# Patient Record
Sex: FEMALE | Race: WHITE | Employment: UNEMPLOYED | URBAN - METROPOLITAN AREA
[De-identification: names, ages, dates, MRNs, and addresses within clinical notes are randomized per-mention and may not be internally consistent; named-entity substitution may affect disease eponyms.]

---

## 2020-11-16 ENCOUNTER — OFFICE VISIT (OUTPATIENT)
Dept: ORTHOPEDICS | Facility: CLINIC | Age: 40
End: 2020-11-16
Payer: OTHER GOVERNMENT

## 2020-11-16 ENCOUNTER — ANCILLARY PROCEDURE (OUTPATIENT)
Dept: GENERAL RADIOLOGY | Facility: CLINIC | Age: 40
End: 2020-11-16
Attending: ORTHOPAEDIC SURGERY
Payer: OTHER GOVERNMENT

## 2020-11-16 VITALS
BODY MASS INDEX: 23.39 KG/M2 | HEIGHT: 63 IN | WEIGHT: 132 LBS | SYSTOLIC BLOOD PRESSURE: 117 MMHG | DIASTOLIC BLOOD PRESSURE: 78 MMHG

## 2020-11-16 DIAGNOSIS — M25.512 ACUTE PAIN OF LEFT SHOULDER: Primary | ICD-10-CM

## 2020-11-16 DIAGNOSIS — M25.512 CHRONIC PERISCAPULAR PAIN ON LEFT SIDE: ICD-10-CM

## 2020-11-16 DIAGNOSIS — M25.512 ACUTE PAIN OF LEFT SHOULDER: ICD-10-CM

## 2020-11-16 DIAGNOSIS — G89.29 CHRONIC PERISCAPULAR PAIN ON LEFT SIDE: ICD-10-CM

## 2020-11-16 PROCEDURE — 99203 OFFICE O/P NEW LOW 30 MIN: CPT | Performed by: ORTHOPAEDIC SURGERY

## 2020-11-16 PROCEDURE — 73030 X-RAY EXAM OF SHOULDER: CPT | Mod: LT | Performed by: RADIOLOGY

## 2020-11-16 RX ORDER — NORGESTIMATE AND ETHINYL ESTRADIOL 7DAYSX3 28
KIT ORAL
COMMUNITY
Start: 2020-07-14

## 2020-11-16 ASSESSMENT — PAIN SCALES - GENERAL: PAINLEVEL: MILD PAIN (2)

## 2020-11-16 ASSESSMENT — MIFFLIN-ST. JEOR: SCORE: 1252.88

## 2020-11-16 NOTE — PROGRESS NOTES
"CHIEF COMPLAINT:   Chief Complaint   Patient presents with     Left Shoulder - Pain     Insidious onset of intermittent left posterior shoulder over the past ~ 4 months.  Right hand dominant.  No current treatment completed.   Rates pain 2/10 currently.  No prior imaging.  No other significant past medical history or family history.      Ana Dsouza is seen today in the [facility] for evaluation of left shoulder pain at the request of Dr. Leandro Brand MD ([facility]).        HISTORY:  Ana Dsouza is a 39 year old female, right  -hand dominant, who is seen in consultation at the request of Dr. Sunday Reeves MD for left shoulder pain that started  ~4 months ago. No injury. Locates pain top/back of shoulder. Pain is aggravated with reaching back, but pain can come on even without activity.  no rest pain. no night pain. Treatment has been rest. No over the counter medications. Once has an \"episode\" it will go away after days/week, then will come back a month or so later.        Onset: ~4months, no injury.  Symptoms have been waxing and waning since that time.  Aggrevated by: reaching  Relieved by: rest  Pain location: superior and posterior  Pain severity: 2/10  Pain quality: sharp and shooting  Frequency of symptoms: episodic  Associated symptoms: neck pain    Treatment up to this point:nothing  Has not tried: ice, Heat, Tylenol and NSAIDS  Prior history of related problems: no prior problems with this area in the past      Other PMH:  has no past medical history on file.  There is no problem list on file for this patient.      Surgical Hx:  has no past surgical history on file.    Medications:   Current Outpatient Medications:      DIPHENHYDRAMINE HCL PO, , Disp: , Rfl:      EPINEPHrine (EPIPEN) 0.3 MG/0.3ML injection, Inject 0.3 mLs (0.3 mg) into the muscle once as needed for anaphylaxis Use if sob, return of symptoms. If use, call 911, Disp: 1 each, Rfl: 0     " "methylPREDNISolone (MEDROL DOSEPAK) 4 MG tablet, Follow package instructions, Disp: 21 tablet, Rfl: 0     Prenatal Vit-Fe Fumarate-FA (PRENATAL MULTIVITAMIN  PLUS IRON) 27-0.8 MG TABS, Take 1 tablet by mouth daily, Disp: , Rfl:     Allergies:   Allergies   Allergen Reactions     Sulfa Drugs        Social Hx: homemaker.   reports that she has never smoked. She has never used smokeless tobacco.    Family Hx: family history is not on file..    REVIEW OF SYSTEMS: 10 point ROS neg other than the symptoms noted above in the HPI and PMH. Notables include  CONSTITUTIONAL:NEGATIVE for fever, chills, change in weight  INTEGUMENTARY/SKIN: NEGATIVE for worrisome rashes, moles or lesions  MUSCULOSKELETAL:See HPI above  NEURO: NEGATIVE for weakness, dizziness or paresthesias    PHYSICAL EXAM:  /78   Ht 1.6 m (5' 3\")   Wt 59.9 kg (132 lb)   BMI 23.38 kg/m     GENERAL APPEARANCE: healthy, alert, no distress  SKIN: no suspicious lesions or rashes  NEURO: Normal strength and tone, mentation intact and speech normal  PSYCH:  mentation appears normal and affect normal, not anxious  RESPIRATORY: No increased work of breathing.  VASCULAR: Radial pulses 2+ and brisk cappillary refill   LYMPH: no palpable axillary lymphadenopathy or cervical neck lymphadenopathy.      MUSCULOSKELETAL:    NECK:  Cervical range of motion: fullcauses pain in neck  Posterior cervical spine nontender to palpation over midline bony prominences  There is mild-moderate tender to palpation along neck paraspinals and trapezius muscles      RIGHT UPPER EXTREMITY:  Sensation intact to light touch in median, radial, ulnar and axillary nerve distributions  Palpable 2+ radial pulse, brisk capillary refill to all fingers, wwp  Intact epl fpl fdp edc wrist flexion/extension biceps triceps deltoid    RIGHT SHOULDER:  Shoulder Inspection: no swelling, bruising, discoloration, or obvious deformity or asymmetry  Tender: upper trapezius muscle, rhomboids and " paraspinals  Non-tender: AC joint, acromion, anterior capsule, proximal bicep tendon and greater tuberosity  Range of Motion:   Active:forward flexion 180 degrees, external rotation  80 degrees, internal rotation  Bra line  Strength: forward flexion 5/5, External rotation 5/5  Liftoff: Able  Impingement: negative.  Special tests: Empty Can: Negative    LEFT UPPER EXTREMITY:  Sensation intact to light touch in median, radial, ulnar and axillary nerve distributions  Palpable 2+ radial pulse, brisk capillary refill to all fingers, wwp  Intact epl fpl fdp edc wrist flexion/extension biceps triceps deltoid    LEFT SHOULDER:  Shoulder Inspection: no swelling, bruising, discoloration, or obvious deformity or asymmetry  Tender: supraspinatus , upper trapezius muscle, rhomboids and neck paraspinals  Non-tender: AC joint, acromion, anterior capsule, proximal bicep tendon, greater tuberosity and infraspinatus  Range of Motion:   Active:forward flexion 180 degrees, external rotation  80 degrees, internal rotation bra line  Strength: forward flexion 5/5, External rotation 5/5  Liftoff: Able  Impingement: negative.  Special tests: Yergason's: negative.  Speed: Negative  Belly Press: Negative  Empty Can: Negative      X-RAY INTERPRETATION: 3 views left  shoulder obtained 11/16/2020 were reviewed personally in clinic today with the patient. On my review, No obvious fracture or dislocation. No obvious bony abnormality or lesion.        ASSESSMENT: Ana Dsouza is a 39 year old female, right  -hand dominant with chronic left shoulder pain, strain, periscapular muscle pain.      PLAN:   * images reviewed, no obvious bony abnormality.  *exam unable to reproduce symptoms other than diffuse periscapular and neck muscle pains  * recommend Physical Therapy, home exercise program, over the counter pain control, massage, over the counter pain control  * consider MRI in future as needed.    Robin Rose M.D., M.S.  Dept. of  Orthopaedic Surgery  [facility]

## 2020-11-16 NOTE — LETTER
"    11/16/2020         RE: Ana Dsouza  [id number] [address] [facility] [address]        Dear Colleague,    Thank you for referring your patient, Ana Dsouza, to the [facility] [place]. Please see a copy of my visit note below.    CHIEF COMPLAINT:   Chief Complaint   Patient presents with     Left Shoulder - Pain     Insidious onset of intermittent left posterior shoulder over the past ~ 4 months.  Right hand dominant.  No current treatment completed.   Rates pain 2/10 currently.  No prior imaging.  No other significant past medical history or family history.      Ana Dsouza is seen today in the [facility] for evaluation of left shoulder pain at the request of Dr. Leandro Brand MD ([facility]).        HISTORY:  Ana Dsouza is a 39 year old female, right  -hand dominant, who is seen in consultation at the request of Dr. Sunday Reeves MD for left shoulder pain that started  ~4 months ago. No injury. Locates pain top/back of shoulder. Pain is aggravated with reaching back, but pain can come on even without activity.  no rest pain. no night pain. Treatment has been rest. No over the counter medications. Once has an \"episode\" it will go away after days/week, then will come back a month or so later.        Onset: ~4months, no injury.  Symptoms have been waxing and waning since that time.  Aggrevated by: reaching  Relieved by: rest  Pain location: superior and posterior  Pain severity: 2/10  Pain quality: sharp and shooting  Frequency of symptoms: episodic  Associated symptoms: neck pain    Treatment up to this point:nothing  Has not tried: ice, Heat, Tylenol and NSAIDS  Prior history of related problems: no prior problems with this area in the past      Other PMH:  has no past medical history on file.  There is no problem list on file for this patient.      Surgical Hx:  has no past surgical history on file.    Medications:   Current " "Outpatient Medications:      DIPHENHYDRAMINE HCL PO, , Disp: , Rfl:      EPINEPHrine (EPIPEN) 0.3 MG/0.3ML injection, Inject 0.3 mLs (0.3 mg) into the muscle once as needed for anaphylaxis Use if sob, return of symptoms. If use, call 911, Disp: 1 each, Rfl: 0     methylPREDNISolone (MEDROL DOSEPAK) 4 MG tablet, Follow package instructions, Disp: 21 tablet, Rfl: 0     Prenatal Vit-Fe Fumarate-FA (PRENATAL MULTIVITAMIN  PLUS IRON) 27-0.8 MG TABS, Take 1 tablet by mouth daily, Disp: , Rfl:     Allergies:   Allergies   Allergen Reactions     Sulfa Drugs        Social Hx: homemaker.   reports that she has never smoked. She has never used smokeless tobacco.    Family Hx: family history is not on file..    REVIEW OF SYSTEMS: 10 point ROS neg other than the symptoms noted above in the HPI and PMH. Notables include  CONSTITUTIONAL:NEGATIVE for fever, chills, change in weight  INTEGUMENTARY/SKIN: NEGATIVE for worrisome rashes, moles or lesions  MUSCULOSKELETAL:See HPI above  NEURO: NEGATIVE for weakness, dizziness or paresthesias    PHYSICAL EXAM:  /78   Ht 1.6 m (5' 3\")   Wt 59.9 kg (132 lb)   BMI 23.38 kg/m     GENERAL APPEARANCE: healthy, alert, no distress  SKIN: no suspicious lesions or rashes  NEURO: Normal strength and tone, mentation intact and speech normal  PSYCH:  mentation appears normal and affect normal, not anxious  RESPIRATORY: No increased work of breathing.  VASCULAR: Radial pulses 2+ and brisk cappillary refill   LYMPH: no palpable axillary lymphadenopathy or cervical neck lymphadenopathy.      MUSCULOSKELETAL:    NECK:  Cervical range of motion: fullcauses pain in neck  Posterior cervical spine nontender to palpation over midline bony prominences  There is mild-moderate tender to palpation along neck paraspinals and trapezius muscles      RIGHT UPPER EXTREMITY:  Sensation intact to light touch in median, radial, ulnar and axillary nerve distributions  Palpable 2+ radial pulse, brisk capillary " refill to all fingers, wwp  Intact epl fpl fdp edc wrist flexion/extension biceps triceps deltoid    RIGHT SHOULDER:  Shoulder Inspection: no swelling, bruising, discoloration, or obvious deformity or asymmetry  Tender: upper trapezius muscle, rhomboids and paraspinals  Non-tender: AC joint, acromion, anterior capsule, proximal bicep tendon and greater tuberosity  Range of Motion:   Active:forward flexion 180 degrees, external rotation  80 degrees, internal rotation  Bra line  Strength: forward flexion 5/5, External rotation 5/5  Liftoff: Able  Impingement: negative.  Special tests: Empty Can: Negative    LEFT UPPER EXTREMITY:  Sensation intact to light touch in median, radial, ulnar and axillary nerve distributions  Palpable 2+ radial pulse, brisk capillary refill to all fingers, wwp  Intact epl fpl fdp edc wrist flexion/extension biceps triceps deltoid    LEFT SHOULDER:  Shoulder Inspection: no swelling, bruising, discoloration, or obvious deformity or asymmetry  Tender: supraspinatus , upper trapezius muscle, rhomboids and neck paraspinals  Non-tender: AC joint, acromion, anterior capsule, proximal bicep tendon, greater tuberosity and infraspinatus  Range of Motion:   Active:forward flexion 180 degrees, external rotation  80 degrees, internal rotation bra line  Strength: forward flexion 5/5, External rotation 5/5  Liftoff: Able  Impingement: negative.  Special tests: Yergason's: negative.  Speed: Negative  Belly Press: Negative  Empty Can: Negative      X-RAY INTERPRETATION: 3 views left  shoulder obtained 11/16/2020 were reviewed personally in clinic today with the patient. On my review, No obvious fracture or dislocation. No obvious bony abnormality or lesion.        ASSESSMENT: Ana Dsouza is a 39 year old female, right  -hand dominant with chronic left shoulder pain, strain, periscapular muscle pain.      PLAN:   * images reviewed, no obvious bony abnormality.  *exam unable to reproduce symptoms other  than diffuse periscapular and neck muscle pains  * recommend Physical Therapy, home exercise program, over the counter pain control, massage, over the counter pain control  * consider MRI in future as needed.    Robin Rose M.D., M.S.  Dept. of Orthopaedic Surgery  [facility]      Again, thank you for allowing me to participate in the care of your patient.        Sincerely,        Robin Rose MD

## 2025-07-26 ENCOUNTER — HEALTH MAINTENANCE LETTER (OUTPATIENT)
Age: 45
End: 2025-07-26